# Patient Record
Sex: FEMALE | Race: WHITE | Employment: FULL TIME | URBAN - METROPOLITAN AREA
[De-identification: names, ages, dates, MRNs, and addresses within clinical notes are randomized per-mention and may not be internally consistent; named-entity substitution may affect disease eponyms.]

---

## 2021-07-08 ENCOUNTER — OFFICE VISIT (OUTPATIENT)
Dept: PULMONOLOGY | Age: 51
End: 2021-07-08
Payer: COMMERCIAL

## 2021-07-08 VITALS
DIASTOLIC BLOOD PRESSURE: 89 MMHG | SYSTOLIC BLOOD PRESSURE: 148 MMHG | OXYGEN SATURATION: 98 % | TEMPERATURE: 97.8 F | HEART RATE: 87 BPM | HEIGHT: 65 IN | WEIGHT: 222.8 LBS | BODY MASS INDEX: 37.12 KG/M2

## 2021-07-08 DIAGNOSIS — J32.9 SINUSITIS, UNSPECIFIED CHRONICITY, UNSPECIFIED LOCATION: ICD-10-CM

## 2021-07-08 DIAGNOSIS — G47.33 OBSTRUCTIVE SLEEP APNEA: ICD-10-CM

## 2021-07-08 DIAGNOSIS — G47.10 HYPERSOMNOLENCE: ICD-10-CM

## 2021-07-08 DIAGNOSIS — J45.40 MODERATE PERSISTENT ASTHMA WITHOUT COMPLICATION: Primary | ICD-10-CM

## 2021-07-08 DIAGNOSIS — E66.9 CLASS 2 OBESITY WITHOUT SERIOUS COMORBIDITY WITH BODY MASS INDEX (BMI) OF 37.0 TO 37.9 IN ADULT, UNSPECIFIED OBESITY TYPE: ICD-10-CM

## 2021-07-08 PROCEDURE — 99214 OFFICE O/P EST MOD 30 MIN: CPT | Performed by: INTERNAL MEDICINE

## 2021-07-08 ASSESSMENT — ENCOUNTER SYMPTOMS
GASTROINTESTINAL NEGATIVE: 1
EYES NEGATIVE: 1
RESPIRATORY NEGATIVE: 1
ALLERGIC/IMMUNOLOGIC NEGATIVE: 1

## 2021-07-08 NOTE — LETTER
7/8/21        Johnathon Pichardo      I have seen this patient in the office today and wanted to communicate my findings and recommendations. Patient Instructions      ASSESSMENT/PLAN:  1. Moderate persistent asthma without complication  2. Obstructive sleep apnea  3. Hypersomnolence  4. Class 2 obesity without serious comorbidity with body mass index (BMI) of 37.0 to 37.9 in adult, unspecified obesity type  5. Sinusitis, unspecified chronicity, unspecified location        This is the old plan, may go back to this later  Winter plan: Once and if the cold weather comes and you start having trouble breathing  Then go to dulera 200/5 2puff twice a day  Or if the use of albuterol rescue > 2 times a week  Or at night waking up with shortness of breath or wheezing        Summer Plan:   start on Dulera 100/5  And will consider ordering this at that time     Fall/Winter plan  On Dulera 200    Right now on   dulera 200 2 puffs twice a day, plan on staying on this for 1 month    2/19/15  Spirometry FEV1 was 95% and 2.89 liter    1/24/19  spirometry showed FEV1 of 94% and 2.79  FENO was 5 ppb    Continue with :  claritin once a day  nasaonex 2 spray/nostril once a day  Albuterol (rescue) Ventolin 2 puffs as needed. Dulera 200/5 2puff twice a day         Tried   dulera 100 but needed 200 with change of weather     Continue with exercise  Sinus are control    Only using albuterol prior to exercise  No limitations at this time    flushot up to date.     Blood pressure was 148/89 watch this, maybe needed repeat  Maybe the white coat syndrome        pneumovac utd    flushot up to date    autopap is set min 5  And max of 15  epr is 3  Standard response  dme is [facility]    Using nasal pillows  I have access via [employer]    Using 83%  Using 6.5 h/night    ahi is 2.0 no sleep apnea  Leak at 6.6 lpm  90% pressure is 9.0    Feeling the benefit of cpap use    Will need to get results of the sleep study      RTC in 3 months.

## 2021-07-08 NOTE — PATIENT INSTRUCTIONS
ASSESSMENT/PLAN:  1. Moderate persistent asthma without complication  2. Obstructive sleep apnea  3. Hypersomnolence  4. Class 2 obesity without serious comorbidity with body mass index (BMI) of 37.0 to 37.9 in adult, unspecified obesity type  5. Sinusitis, unspecified chronicity, unspecified location        This is the old plan, may go back to this later  Winter plan: Once and if the cold weather comes and you start having trouble breathing  Then go to dulera 200/5 2puff twice a day  Or if the use of albuterol rescue > 2 times a week  Or at night waking up with shortness of breath or wheezing        Summer Plan:   start on Dulera 100/5  And will consider ordering this at that time     Fall/Winter plan  On Dulera 200    Right now on   dulera 200 2 puffs twice a day, plan on staying on this for 1 month    2/19/15  Spirometry FEV1 was 95% and 2.89 liter    1/24/19  spirometry showed FEV1 of 94% and 2.79  FENO was 5 ppb    Continue with :  claritin once a day  nasaonex 2 spray/nostril once a day  Albuterol (rescue) Ventolin 2 puffs as needed. Dulera 200/5 2puff twice a day         Tried   dulera 100 but needed 200 with change of weather     Continue with exercise  Sinus are control    Only using albuterol prior to exercise  No limitations at this time    flushot up to date. Blood pressure was 148/89 watch this, maybe needed repeat  Maybe the white coat syndrome        pneumovac utd    flushot up to date    autopap is set min 5  And max of 15  epr is 3  Standard response  dme is [facility]    Using nasal pillows  I have access via airview    Using 83%  Using 6.5 h/night    ahi is 2.0 no sleep apnea  Leak at 6.6 lpm  90% pressure is 9.0    Feeling the benefit of cpap use    Will need to get results of the sleep study      RTC in 3 months.        CLEANING INSTRUCTIONS     Keeping your equipment and supplies clean is very important.      REMINDER: Only use DISTILLED WATER in your humidifier, Empty water daily.    DAILY  Mask and tubing:   · Wash your face before applying mask  · Wash mask and tubing with baby shampoo and warm water. Humidifier:   · Empty water in reservoir  · Clean with baby shampoo and warm water  · Rinse, then air dry    WEEKLY  Mask and tubing:   · Soak your mask and tubing in 1 part vinegar and 3 parts water for 30 minutes. Rinse, and allow to air dry. Humidifier:   · Wash with warm water and baby shampoo  · Soak in 1 part vinegar and 3 parts water for 30 minutes  · Rinse with warm water and allow to air dry    Machine Exterior:   · Wipe with a clean damp cloth    MONTHLY AND/OR AS NEEDED  · Reusable foam filters (black filter)- wash in warm water with baby shampoo. Rinse well and dry with paper towel  · Disposable felt filter (white filter)- Replace filter every two weeks to once a month    NOTE: If you are having repeated sinus and /or respiratory infections, dirty equipment may be the cause. It may help to clean and disinfect your equipment more frequently    TRAVELING  Always make sure the humidifier is empty when traveling. (including doctor appointments, air travel or long distance driving). When flying, always carry your PAP device with you as a carry on item, NEVER check it in as baggage on airlines. You will have to remove the CPAP unit so that it can be tested by the TSA. We can provide you with a letter stating it is a medical device, talk to your provider. Always make sure you have your mask and tubing with you. You will need appropriate plug adapters when traveling outside the [place]. If flying and unable to carry distilled water with you do not use tap water but instead use bottled water (no more than 2 weeks). PAP SUPPLY REPLACEMENT SCHEDULE    To get the most benefit from your PAP therapy, your equipment should be replaced when necessary based on wear and tear.  For example, your mask may need to be replaced if you notice it is cracked or the seal is leaking. If your tubing is torn, it needs to be replaced. If you equipment is showing signs of wear, you may be entitled to replace it. The replacement schedule for Medicare patients is shown below. If you are NOT a Medicare patient, please check with your DME (Durable Medical Supply) provider for your individual insurance policy's replacement schedule.      Equipment Medicare My Insurance Plan  Mask 1 per 3 months _______________  Nasal replacement cushion 1 per month _______________  Pillows replacement cushion 1 per month _______________  Full-face cushion 1 per month _______________  Headgear 1 per 6 months _______________  Chinstrap 1 per 6 months _______________  Water chamber 1 per 6 months _______________  Tubing 1 per 3 months _______________  Filter, fine disposable (white) 2 per month _______________  Filter, gross particle foam (black) 1 per 6 months _______________  Therapy device 1 per 5 years _______________

## 2021-07-08 NOTE — PROGRESS NOTES
Víctor Guerin (:  1970) is a 54 y.o. female,Established patient, here for evaluation of the following chief complaint(s):  Asthma (asthma f/u / )         ASSESSMENT/PLAN:  1. Moderate persistent asthma without complication  2. Obstructive sleep apnea  3. Hypersomnolence  4. Class 2 obesity without serious comorbidity with body mass index (BMI) of 37.0 to 37.9 in adult, unspecified obesity type  5. Sinusitis, unspecified chronicity, unspecified location        This is the old plan, may go back to this later  Winter plan: Once and if the cold weather comes and you start having trouble breathing  Then go to dulera 200/5 2puff twice a day  Or if the use of albuterol rescue > 2 times a week  Or at night waking up with shortness of breath or wheezing        Summer Plan:   start on Dulera 100/5  And will consider ordering this at that time     Fall/Winter plan  On Dulera 200    Right now on   dulera 200 2 puffs twice a day, plan on staying on this for 1 month    2/19/15  Spirometry FEV1 was 95% and 2.89 liter    19  spirometry showed FEV1 of 94% and 2.79  FENO was 5 ppb    Continue with :  claritin once a day  nasaonex 2 spray/nostril once a day  Albuterol (rescue) Ventolin 2 puffs as needed. Dulera 200/5 2puff twice a day         Tried   dulera 100 but needed 200 with change of weather     Continue with exercise  Sinus are control    Only using albuterol prior to exercise  No limitations at this time    flushot up to date.     Blood pressure was 148/89 watch this, maybe needed repeat  Maybe the white coat syndrome        pneumovac utd    flushot up to date    autopap is set min 5  And max of 15  epr is 3  Standard response  dme is [facility]    Using nasal pillows  I have access via airview    Using 83%  Using 6.5 h/night    ahi is 2.0 no sleep apnea  Leak at 6.6 lpm  90% pressure is 9.0    Feeling the benefit of cpap use    Will need to get results of the sleep study      RTC in 3 months.  No follow-ups on file. I have personally reviewed and summarized the old records a      I have independently reviewed the images and reviewed with patient    I have reviewed the lab tests, radiology reports and medications    I have downloaded and interpreted the cpap/bipap/pap data. I have made adjustments as described    Reviewed present meds and side effects. Continue present meds. Stay compliant. Call if worsens. Reviewed proper inhaler usage    Will ask for old records              Janell Gutierrez [address]  [address]  [address] [facility] [place] [facility] [address]  Dept: [phone]  Loc: [phone]     Diagnosis:  [x] HENRY (ICD-10: G47.33)  o CSA (ICD-10: G47.31)  [] Complex Sleep Apnea (ICD-10: G47.37)  []  (ICD-10: G47.37)  [] Hypoxemia (ICD-10: R09.02)  [] COPD (J44.90)  [] Chronic Respiratory Failure with hypoxemia (J96.11)  [] Chronicr Respiratory Failure with hypercapnia (J96.12)  [] Restrictive Lung Disease (J98.4)      [] New Rx (Device Preference: _________________________)     [] Change Order       [] Replace ___________    [] Discontinue Order -  [] CPAP    [] BPAP    [] PAP    [] Oxygen   /   AMA?  [] Yes   [] No              Therapy    AHI: ________ CAMMY: ________  LOW SpO2: ________%      DME:[facility]    DEVICE / SETTINGS RAMP / COMFORT INTERFACE   []  CPAP () Pressure    Ramp: _________ min's  [] Ramp to patient preference General PAP Supply Kit  Medicaid does not cover heated tubing  ([employer])  PAP Tubing:  [x] Heated ()- 1/3 mos                         [x] Regular ()  1/3 mos  [x] Disposable Water Chamber () - 1/6 mos  [x] Disposable Filter () - 2/mo  [x] Non-Disposable Filter () - 1/6 mos   []  BiLevel PAP ()           IPAP        []  BiLevel PAP with   ()       Backup Rate ()      EPAP Rate  [] Adjust FLEX to patient comfort       SUPPLEMENTAL OXYGEN  [] OXYGEN:      Liter/min: Rate                                         Inspiratory time                                                         The following equipment is Medically Necessary for the above stated patient. It is reasonable and necessary in reference to acceptable standards of medical practice for this condition, and is not prescribed as a convenience. Frequency of Use:    Daily                 Length of Need: 13 Months              o The patient requires BiLevel PAP and the following apply: []  The patient requires a Respiratory Assist Device (RAD) and the following apply:   o CPAP was tried and failed to meet therapeutic goals. [] CPAP was tried, but failed to meet therapeutic goals   o The prescribed mask interface has been properly fit, is the most comfortable to the patient and will be used with the BPAP device. [] The prescribed mask interface has been properly fit, is the most comfortable to the patient and will be used with the RAD.   o Current CPAP setting prevents patient from tolerating the therapy and lower CPAP settings fail to adequately control the symptoms of HENRY, improve sleep quality, or reduce AHI to acceptable levels. [] Current CPAP setting prevent patient from tolerating the therapy and lower PAP settings fail to  adequately control HENRY symptoms, improve sleep quality, or reduce AHI to acceptable levels.          [] There is significant improvement of the respiratory events on the RAD                                                                                                                                                                                                                                  Gigi Alves MD NPI- [id number]     [place]- [id number]                    07/08/21       ____________________________                        _______________________           Physician Signature                                                         Date cough  No wheezing after steroids    Overall seems to be getting better    Now back on dulera 200 mg  Albuterol as needed      Since cpap  No snoring  No bloating  No burping  No headache  No chest pain  No ear popping  No rls  No plms    Going to bed at 11 pm and waking up 8 am  Sleepiness is little better          Review of Systems   Constitutional: Negative. HENT: Negative. Eyes: Negative. Respiratory: Negative. Cardiovascular: Negative. Gastrointestinal: Negative. Endocrine: Negative. Genitourinary: Negative. Musculoskeletal: Negative. Skin: Negative. Allergic/Immunologic: Negative. Neurological: Negative. Hematological: Negative. Psychiatric/Behavioral: Negative. Objective   Physical Exam  Vitals and nursing note reviewed. Constitutional:       General: She is not in acute distress. Appearance: Normal appearance. She is not ill-appearing. HENT:      Head: Normocephalic and atraumatic. Right Ear: External ear normal.      Left Ear: External ear normal.      Nose: Nose normal.      Mouth/Throat:      Mouth: Mucous membranes are moist.      Pharynx: Oropharynx is clear. Comments: Mallampati 2  Eyes:      General: No scleral icterus. Extraocular Movements: Extraocular movements intact. Conjunctiva/sclera: Conjunctivae normal.      Pupils: Pupils are equal, round, and reactive to light. Cardiovascular:      Rate and Rhythm: Normal rate and regular rhythm. Pulses: Normal pulses. Heart sounds: Normal heart sounds. No murmur heard. No friction rub. Pulmonary:      Effort: Pulmonary effort is normal. No respiratory distress. Breath sounds: Normal breath sounds. No stridor. No wheezing, rhonchi or rales. Chest:      Chest wall: No tenderness. Abdominal:      General: Abdomen is flat. Bowel sounds are normal. There is no distension. Tenderness: There is no abdominal tenderness. There is no guarding.    Musculoskeletal: General: No swelling or tenderness. Normal range of motion. Cervical back: Normal range of motion and neck supple. No rigidity. Skin:     General: Skin is warm and dry. Coloration: Skin is not jaundiced. Neurological:      General: No focal deficit present. Mental Status: She is alert and oriented to person, place, and time. Mental status is at baseline. Cranial Nerves: No cranial nerve deficit. Sensory: No sensory deficit. Motor: No weakness. Gait: Gait normal.   Psychiatric:         Mood and Affect: Mood normal.         Thought Content:  Thought content normal.         Judgment: Judgment normal.                  An electronic signature was used to authenticate this note.    --Gigi Alves MD

## 2021-07-12 RX ORDER — MOMETASONE FUROATE 50 UG/1
2 SPRAY, METERED NASAL DAILY
Qty: 1 INHALER | Refills: 2 | Status: SHIPPED | OUTPATIENT
Start: 2021-07-12

## 2021-07-29 RX ORDER — MOMETASONE FUROATE 50 UG/1
2 SPRAY, METERED NASAL DAILY
Qty: 3 INHALER | Refills: 1 | Status: SHIPPED | OUTPATIENT
Start: 2021-07-29 | End: 2022-03-11

## 2021-07-29 NOTE — TELEPHONE ENCOUNTER
Patient requesting 90 day scripts for nasonex and both strengths of dulera. Scripts pended if appropriate.

## 2021-12-27 ENCOUNTER — OFFICE VISIT (OUTPATIENT)
Dept: PULMONOLOGY | Age: 51
End: 2021-12-27
Payer: COMMERCIAL

## 2021-12-27 VITALS
TEMPERATURE: 98.2 F | HEART RATE: 98 BPM | RESPIRATION RATE: 18 BRPM | WEIGHT: 204 LBS | HEIGHT: 65 IN | BODY MASS INDEX: 33.99 KG/M2 | DIASTOLIC BLOOD PRESSURE: 93 MMHG | SYSTOLIC BLOOD PRESSURE: 155 MMHG | OXYGEN SATURATION: 98 %

## 2021-12-27 DIAGNOSIS — G47.10 HYPERSOMNOLENCE: ICD-10-CM

## 2021-12-27 DIAGNOSIS — G47.33 OBSTRUCTIVE SLEEP APNEA: ICD-10-CM

## 2021-12-27 DIAGNOSIS — J45.40 MODERATE PERSISTENT ASTHMA WITHOUT COMPLICATION: Primary | ICD-10-CM

## 2021-12-27 DIAGNOSIS — E66.9 CLASS 2 OBESITY WITHOUT SERIOUS COMORBIDITY WITH BODY MASS INDEX (BMI) OF 37.0 TO 37.9 IN ADULT, UNSPECIFIED OBESITY TYPE: ICD-10-CM

## 2021-12-27 DIAGNOSIS — J32.9 SINUSITIS, UNSPECIFIED CHRONICITY, UNSPECIFIED LOCATION: ICD-10-CM

## 2021-12-27 PROCEDURE — 99214 OFFICE O/P EST MOD 30 MIN: CPT | Performed by: INTERNAL MEDICINE

## 2021-12-27 RX ORDER — ALBUTEROL SULFATE 90 UG/1
2 AEROSOL, METERED RESPIRATORY (INHALATION) EVERY 6 HOURS PRN
COMMUNITY

## 2021-12-27 ASSESSMENT — ENCOUNTER SYMPTOMS
EYES NEGATIVE: 1
ALLERGIC/IMMUNOLOGIC NEGATIVE: 1
RESPIRATORY NEGATIVE: 1
GASTROINTESTINAL NEGATIVE: 1

## 2021-12-27 NOTE — PROGRESS NOTES
Rubia Alston (:  1970) is a 54 y.o. female,Established patient, here for evaluation of the following chief complaint(s):  Follow-up (3 mon Sleep/lungs)         ASSESSMENT/PLAN:  1. Moderate persistent asthma without complication  2. Obstructive sleep apnea  3. Hypersomnolence  4. Class 2 obesity without serious comorbidity with body mass index (BMI) of 37.0 to 37.9 in adult, unspecified obesity type  5. Sinusitis, unspecified chronicity, unspecified location        This is the old plan, may go back to this later  Winter plan: Once and if the cold weather comes and you start having trouble breathing  Then go to dulera 200/5 2puff twice a day  Or if the use of albuterol rescue > 2 times a week  Or at night waking up with shortness of breath or wheezing        Summer Plan:   start on Dulera 100/5  And will consider ordering this at that time     Fall/Winter plan  On Dulera 200    Right now on   dulera 100 2 puffs twice a day    2/19/15  Spirometry FEV1 was 95% and 2.89 liter    19  spirometry showed FEV1 of 94% and 2.79  FENO was 5 ppb    Continue with :  claritin once a day  nasaonex 2 spray/nostril once a day  Albuterol (rescue) Ventolin 2 puffs as needed. Dulera 100/5 2puff twice a day         Tried   dulera 100 but needed 200 with change of weather     Continue with exercise  Sinus are control    Only using albuterol prior to exercise  No limitations at this time    flushot up to date.     Blood pressure was high watch this, maybe needed repeat  This is white coat syndrome        pneumovac utd  covid up todate with booster  flushot up to date    autopap is set min 5  And max of 15  epr is 3  Standard response  dme is [facility]    Using nasal pillows  I have access via airview    Using 100%  Using 7.5 h/night    ahi is 1.2 no sleep apnea  Leak at 1.3 lpm  90% pressure is 7.3    Feeling the benefit of cpap use    Will need to get results of the sleep study      RTC in 6 months.          No _________  [] Continuous        [] Nocturnal  [] Bleed into PAP Device      []  [facility] Interface Kit    [x] Mask interface () - 1/3 mos  [x] Nasal Cushion ()  2/mo  [x] Nasal Pillows ()  -2/mo  [x] Headgear ()  1/6 mos   []  AutoBiLevel () Pressure  ()      Support           []  ResMed® IVAPS EPAP  [] Overnight Oximetry on Room Air  [] Overnight Oximetry on PAP Therapy    Target Pt Rate  Min PS      Target Va  Patient Ht  Ti Max                Ti Min        Rise time  Max PS  Trigger  Cycle  Epap  Epap max  Epap min  The patient has a history of:  [] Excessive Daytime Sleepiness  [] Insomnia  [] Impaired Cognition  [] Ischemic Heart Disease  [] Hypertension  [] Mood Disorders          [] History of Stroke  Additional Orders:______________________________________________________________________________________________________________________________________________________________________________     Full Face Mask Kit    [] Full face mask ()  1/3 mos  [] Full Face Cushion ()  1/mo  [] Headgear ()  1/6 mos                                           [] Respironics® ASV Advanced ()     EPAP Min  PS Min      EPAP Max  PS Max   Additional Supplies    [] Chin Strap ()  [] Heated Humidifier Kit ()  Mask Specifications:   [] Patient Preference      -or-            Brand:______________ Size:_______________   Type: __________________________________   [] Mask Refit:___________________________                                           Max Press  Ramp Time      Rate  Bi-flex: []1  []2  []3     [] Respironics® AVAPS: ()     IPAP Min  IPAP Max  Pressure Max  Epap max  Epap min  Rise time                      Avaps rate  EPAP   Additional Services    [] Annual PRN service and check of equipment  [] Routine service and check of equipment  [] Download and report compliance data   Tidal volume      Tigger Rate                                         Inspiratory time                                                         The following equipment is Medically Necessary for the above stated patient. It is reasonable and necessary in reference to acceptable standards of medical practice for this condition, and is not prescribed as a convenience. Frequency of Use:    Daily                 Length of Need: 13 Months              o The patient requires BiLevel PAP and the following apply: []  The patient requires a Respiratory Assist Device (RAD) and the following apply:   o CPAP was tried and failed to meet therapeutic goals. [] CPAP was tried, but failed to meet therapeutic goals   o The prescribed mask interface has been properly fit, is the most comfortable to the patient and will be used with the BPAP device. [] The prescribed mask interface has been properly fit, is the most comfortable to the patient and will be used with the RAD.   o Current CPAP setting prevents patient from tolerating the therapy and lower CPAP settings fail to adequately control the symptoms of HENRY, improve sleep quality, or reduce AHI to acceptable levels. [] Current CPAP setting prevent patient from tolerating the therapy and lower PAP settings fail to  adequately control HENRY symptoms, improve sleep quality, or reduce AHI to acceptable levels.          [] There is significant improvement of the respiratory events on the RAD                                                                                                                                                                                                                                  Xena Bellamy MD NPI- [id number]     KOTKA- [id number]                    12/27/21       ____________________________                        _______________________           Physician Signature                                                         Date Subjective   SUBJECTIVE/OBJECTIVE:  +      And staying with this dose    Was not able to go back to 100 dulera  And still on the 200    Albuterol prn    Using albuterol before exercise    No exacerbations    albuteorl prn    No GERD  Sinus stable with medications    Albuterol prn only with exercise    No noct symptoms    Still on dulera 200    Walked up 5 flights           Jose Juan Rodriguez is a 54year old year old, female, with PMH significant for asthma, that presents today for c/o poor sleep quality. Pt reports nightly snoring for 3 years that it is getting worse. Pt reports does snore moderate for 3 year(s). Patient's partner does complain of pt snoring. Pt's partner does notice that she stops breathing during sleep. Pt's does wake herself with snoring. Pt does report fatigue or tiredness frequently. Pt sleeps more than 7 hours, and overwhelming sleepiness attacks. Pt dozes unintentionally while watching TV. While driving does feel drowsy. Pt does not have h/o sleepiness associated wrecks/near wrecks. Pt does not report having restless legs. She does report having nasal congestion. Positive for use of nasal sprays, nose or sinus surgery. Positive for broken nose, negative tonsillectomy, sleeping w/ chest raised. She does not sleep with oxygen. Pt does not have a dental appliance or braces on teeth. Denies teeth grinding. She does not report nightmares, sleep walking, or dreaming during naps. When angry or laughing Jose Juan Rodriguez does not report cataplexy. She does not report hallucinations when dozing off or immediately upon awakening. She does not report sleep paralysis. Denies parasomnia. She denies sleep walking, sleep talking, enuresis and sleep terrors as a child. Patient's [place] Sleepiness score : 3 is inconsistent with moderate daytime sleepiness.           Bronchitis in June 2021  And given steroids  Dec bs at bases  Some sob  Some cough  No wheezing after steroids    Overall seems to be getting better    Now back on dulera 200 mg  Albuterol as needed      Since cpap  No snoring  No bloating  No burping  No headache  No chest pain  No ear popping  No rls  No plms    Going to bed at 11 pm and waking up 8 am  Sleepiness is little better          Review of Systems   Constitutional: Negative. HENT: Negative. Eyes: Negative. Respiratory: Negative. Cardiovascular: Negative. Gastrointestinal: Negative. Endocrine: Negative. Genitourinary: Negative. Musculoskeletal: Negative. Skin: Negative. Allergic/Immunologic: Negative. Neurological: Negative. Hematological: Negative. Psychiatric/Behavioral: Negative. Vitals:    12/27/21 1122   BP: (!) 155/93   Pulse: 98   Resp: 18   Temp: 98.2 °F (36.8 °C)   TempSrc: Temporal   SpO2: 98%   Weight: 204 lb (92.5 kg)   Height: 5' 5\" (1.651 m)       Objective   Physical Exam  Vitals and nursing note reviewed. Constitutional:       General: She is not in acute distress. Appearance: Normal appearance. She is not ill-appearing. HENT:      Head: Normocephalic and atraumatic. Right Ear: External ear normal.      Left Ear: External ear normal.      Nose: Nose normal.      Mouth/Throat:      Mouth: Mucous membranes are moist.      Pharynx: Oropharynx is clear. Comments: Mallampati 2  Eyes:      General: No scleral icterus. Extraocular Movements: Extraocular movements intact. Conjunctiva/sclera: Conjunctivae normal.      Pupils: Pupils are equal, round, and reactive to light. Cardiovascular:      Rate and Rhythm: Normal rate and regular rhythm. Pulses: Normal pulses. Heart sounds: Normal heart sounds. No murmur heard. No friction rub. Pulmonary:      Effort: Pulmonary effort is normal. No respiratory distress. Breath sounds: Normal breath sounds. No stridor. No wheezing, rhonchi or rales. Chest:      Chest wall: No tenderness. Abdominal:      General: Abdomen is flat. Bowel sounds are normal. There is no distension. Tenderness: There is no abdominal tenderness. There is no guarding. Musculoskeletal:         General: No swelling or tenderness. Normal range of motion. Cervical back: Normal range of motion and neck supple. No rigidity. Skin:     General: Skin is warm and dry. Coloration: Skin is not jaundiced. Neurological:      General: No focal deficit present. Mental Status: She is alert and oriented to person, place, and time. Mental status is at baseline. Cranial Nerves: No cranial nerve deficit. Sensory: No sensory deficit. Motor: No weakness. Gait: Gait normal.   Psychiatric:         Mood and Affect: Mood normal.         Thought Content:  Thought content normal.         Judgment: Judgment normal.                  An electronic signature was used to authenticate this note.    --Gil Lovell MD

## 2021-12-27 NOTE — PATIENT INSTRUCTIONS
ASSESSMENT/PLAN:  1. Moderate persistent asthma without complication  2. Obstructive sleep apnea  3. Hypersomnolence  4. Class 2 obesity without serious comorbidity with body mass index (BMI) of 37.0 to 37.9 in adult, unspecified obesity type  5. Sinusitis, unspecified chronicity, unspecified location        This is the old plan, may go back to this later  Winter plan: Once and if the cold weather comes and you start having trouble breathing  Then go to dulera 200/5 2puff twice a day  Or if the use of albuterol rescue > 2 times a week  Or at night waking up with shortness of breath or wheezing        Summer Plan:   start on Dulera 100/5  And will consider ordering this at that time     Fall/Winter plan  On Dulera 200    Right now on   dulera 100 2 puffs twice a day    2/19/15  Spirometry FEV1 was 95% and 2.89 liter    1/24/19  spirometry showed FEV1 of 94% and 2.79  FENO was 5 ppb    Continue with :  claritin once a day  nasaonex 2 spray/nostril once a day  Albuterol (rescue) Ventolin 2 puffs as needed. Dulera 100/5 2puff twice a day         Tried   dulera 100 but needed 200 with change of weather     Continue with exercise  Sinus are control    Only using albuterol prior to exercise  No limitations at this time    flushot up to date. Blood pressure was high watch this, maybe needed repeat  This is white coat syndrome        pneumovac utd  covid up todate with booster  flushot up to date    autopap is set min 5  And max of 15  epr is 3  Standard response  dme is [facility]    Using nasal pillows  I have access via [employer]    Using 100%  Using 7.5 h/night    ahi is 1.2 no sleep apnea  Leak at 1.3 lpm  90% pressure is 7.3    Feeling the benefit of cpap use    Will need to get results of the sleep study      RTC in 6 months.    Remember to bring a list of pulmonary medications and any CPAP or BiPAP machines to your next appointment with the office.      Please keep all of your future appointments scheduled by [facility], [facility] office. Out of respect for other patients and providers, you may be asked to reschedule your appointment if you arrive later than your scheduled appointment time. Appointments cancelled less than 24hrs in advance will be considered a no show. Patients with three missed appointments within 1 year or four missed appointments within 2 years can be dismissed from the practice. Please be aware that our physicians are required to work in the Intensive Care Unit at [facility].  Your appointment may need to be rescheduled if they are designated to work during your appointment time. You may receive a survey regarding the care you received during your visit. Your input is valuable to us. We encourage you to complete and return your survey. We hope you will choose us in the future for your healthcare needs. Pt instructed of all future appointment dates & times, including radiology, labs, procedures & referrals. If procedures were scheduled preparation instructions provided. Instructions on future appointments with [facility] were given.

## 2022-03-12 RX ORDER — MOMETASONE FUROATE 50 UG/1
SPRAY, METERED NASAL
Qty: 3 EACH | Refills: 3 | Status: SHIPPED | OUTPATIENT
Start: 2022-03-12 | End: 2022-05-25 | Stop reason: CLARIF

## 2022-05-25 ENCOUNTER — OFFICE VISIT (OUTPATIENT)
Dept: PULMONOLOGY | Age: 52
End: 2022-05-25
Payer: COMMERCIAL

## 2022-05-25 VITALS
DIASTOLIC BLOOD PRESSURE: 99 MMHG | TEMPERATURE: 98 F | HEIGHT: 65 IN | BODY MASS INDEX: 34.82 KG/M2 | WEIGHT: 209 LBS | SYSTOLIC BLOOD PRESSURE: 154 MMHG | RESPIRATION RATE: 16 BRPM | HEART RATE: 102 BPM | OXYGEN SATURATION: 99 %

## 2022-05-25 DIAGNOSIS — G47.10 HYPERSOMNOLENCE: ICD-10-CM

## 2022-05-25 DIAGNOSIS — E66.9 CLASS 2 OBESITY WITHOUT SERIOUS COMORBIDITY WITH BODY MASS INDEX (BMI) OF 37.0 TO 37.9 IN ADULT, UNSPECIFIED OBESITY TYPE: ICD-10-CM

## 2022-05-25 DIAGNOSIS — J45.40 MODERATE PERSISTENT ASTHMA WITHOUT COMPLICATION: Primary | ICD-10-CM

## 2022-05-25 DIAGNOSIS — J32.9 SINUSITIS, UNSPECIFIED CHRONICITY, UNSPECIFIED LOCATION: ICD-10-CM

## 2022-05-25 DIAGNOSIS — G47.33 OBSTRUCTIVE SLEEP APNEA: ICD-10-CM

## 2022-05-25 PROCEDURE — 99214 OFFICE O/P EST MOD 30 MIN: CPT | Performed by: INTERNAL MEDICINE

## 2022-05-25 ASSESSMENT — ENCOUNTER SYMPTOMS
EYES NEGATIVE: 1
RESPIRATORY NEGATIVE: 1
GASTROINTESTINAL NEGATIVE: 1
ALLERGIC/IMMUNOLOGIC NEGATIVE: 1

## 2022-05-25 NOTE — PROGRESS NOTES
MA Communication:   The following orders are received by verbal communication from Sharon Gates MD    Orders include:  8 mo fu scheduled 1/11/23

## 2022-05-25 NOTE — LETTER
[facility] Critical [facility] Sleep  [address] [address] [address]  Phone: [phone]  Fax: [phone]    Michel Runner, MD        May 25, 2022     Patient: Haily Daily   YOB: 1970   Date of Visit: 5/25/2022 5/25/22        Haily Conception      I have seen this patient in the office today and wanted to communicate my findings and recommendations. Patient Instructions        ASSESSMENT/PLAN:  1. Moderate persistent asthma without complication  2. Obstructive sleep apnea  3. Hypersomnolence  4. Class 2 obesity without serious comorbidity with body mass index (BMI) of 37.0 to 37.9 in adult, unspecified obesity type  5. Sinusitis, unspecified chronicity, unspecified location        This is the old plan, may go back to this later  Winter plan: Once and if the cold weather comes and you start having trouble breathing  Then go to dulera 200/5 2puff twice a day  Or if the use of albuterol rescue > 2 times a week  Or at night waking up with shortness of breath or wheezing     May consider using  dulera 200 if any congestion twice a day for 5-7 days     Summer Plan:   start on Dulera 100/5  And will consider ordering this at that time     Fall/Winter plan  On Dulera 200    Right now on   dulera 100 2 puffs twice a day    2/19/15  Spirometry FEV1 was 95% and 2.89 liter    1/24/19  spirometry showed FEV1 of 94% and 2.79  FENO was 5 ppb    Continue with :  claritin once a day  nasaonex 2 spray/nostril once a day  Albuterol (rescue) Ventolin 2 puffs as needed, only with exercise  Dulera 100/5 2puff twice a day         Continue with exercise  Sinus are control    Only using albuterol prior to exercise  No limitations at this time    flushot up to date.     Blood pressure was high watch this, maybe needed repeat  This is white coat syndrome        pneumovac utd  covid up todate with booster  flushot up to date            autopap is set min 5  And max of 15  epr is 3  Standard response  dme is [facility]    Using nasal pillows  I have access via airview    Using 100%  Using 7.5 h/night      ahi is 1.9 no sleep apnea  Leak at 0.5 lpm  90% pressure is 8.5  New mask and doing well, with nasal mask    Feeling the benefit of cpap use    Will need to get results of the sleep study      RTC in 8 months.                           Thank you for allowing me to assist in the care of the MD Sharon Leong MD

## 2022-05-25 NOTE — PATIENT INSTRUCTIONS
ASSESSMENT/PLAN:  1. Moderate persistent asthma without complication  2. Obstructive sleep apnea  3. Hypersomnolence  4. Class 2 obesity without serious comorbidity with body mass index (BMI) of 37.0 to 37.9 in adult, unspecified obesity type  5. Sinusitis, unspecified chronicity, unspecified location        This is the old plan, may go back to this later  Winter plan: Once and if the cold weather comes and you start having trouble breathing  Then go to dulera 200/5 2puff twice a day  Or if the use of albuterol rescue > 2 times a week  Or at night waking up with shortness of breath or wheezing     May consider using  dulera 200 if any congestion twice a day for 5-7 days     Summer Plan:   start on Dulera 100/5  And will consider ordering this at that time     Fall/Winter plan  On Dulera 200    Right now on   dulera 100 2 puffs twice a day    2/19/15  Spirometry FEV1 was 95% and 2.89 liter    1/24/19  spirometry showed FEV1 of 94% and 2.79  FENO was 5 ppb    Continue with :  claritin once a day  nasaonex 2 spray/nostril once a day  Albuterol (rescue) Ventolin 2 puffs as needed, only with exercise  Dulera 100/5 2puff twice a day         Continue with exercise  Sinus are control    Only using albuterol prior to exercise  No limitations at this time    flushot up to date.     Blood pressure was high watch this, maybe needed repeat  This is white coat syndrome        pneumovac utd  covid up todate with booster  flushot up to date            autopap is set min 5  And max of 15  epr is 3  Standard response  dme is [facility]    Using nasal pillows  I have access via [employer]    Using 100%  Using 7.5 h/night      ahi is 1.9 no sleep apnea  Leak at 0.5 lpm  90% pressure is 8.5  New mask and doing well, with nasal mask    Feeling the benefit of cpap use    Will need to get results of the sleep study      RTC in 8 months.      Remember to bring a list of pulmonary medications and any CPAP or BiPAP machines to your next appointment with the office. Please keep all of your future appointments scheduled by [address], [facility] office. Out of respect for other patients and providers, you may be asked to reschedule your appointment if you arrive later than your scheduled appointment time. Appointments cancelled less than 24hrs in advance will be considered a no show. Patients with three missed appointments within 1 year or four missed appointments within 2 years can be dismissed from the practice. Please be aware that our physicians are required to work in the Intensive Care Unit at [facility].  Your appointment may need to be rescheduled if they are designated to work during your appointment time. You may receive a survey regarding the care you received during your visit. Your input is valuable to us. We encourage you to complete and return your survey. We hope you will choose us in the future for your healthcare needs. Pt instructed of all future appointment dates & times, including radiology, labs, procedures & referrals. If procedures were scheduled preparation instructions provided. Instructions on future appointments with [facility] were given.

## 2022-05-25 NOTE — PROGRESS NOTES
Toña Peterson (:  1970) is a 50 y.o. female,Established patient, here for evaluation of the following chief complaint(s):  Sleep Apnea and Asthma         ASSESSMENT/PLAN:  1. Moderate persistent asthma without complication  2. Obstructive sleep apnea  3. Hypersomnolence  4. Class 2 obesity without serious comorbidity with body mass index (BMI) of 37.0 to 37.9 in adult, unspecified obesity type  5. Sinusitis, unspecified chronicity, unspecified location        This is the old plan, may go back to this later  Winter plan: Once and if the cold weather comes and you start having trouble breathing  Then go to dulera 200/5 2puff twice a day  Or if the use of albuterol rescue > 2 times a week  Or at night waking up with shortness of breath or wheezing     May consider using  dulera 200 if any congestion twice a day for 5-7 days     Summer Plan:   start on Dulera 100/5  And will consider ordering this at that time     Fall/Winter plan  On Dulera 200    Right now on   dulera 100 2 puffs twice a day    2/19/15  Spirometry FEV1 was 95% and 2.89 liter    19  spirometry showed FEV1 of 94% and 2.79  FENO was 5 ppb    Continue with :  claritin once a day  nasaonex 2 spray/nostril once a day  Albuterol (rescue) Ventolin 2 puffs as needed, only with exercise  Dulera 100/5 2puff twice a day         Continue with exercise  Sinus are control    Only using albuterol prior to exercise  No limitations at this time    flushot up to date.     Blood pressure was high watch this, maybe needed repeat  This is white coat syndrome        pneumovac utd  covid up todate with booster  flushot up to date            autopap is set min 5  And max of 15  epr is 3  Standard response  dme is [facility]    Using nasal pillows  I have access via [employer]    Using 100%  Using 7.5 h/night      ahi is 1.9 no sleep apnea  Leak at 0.5 lpm  90% pressure is 8.5  New mask and doing well, with nasal mask    Feeling the benefit of cpap use    Will need to get results of the sleep study      RTC in 8 months.          No follow-ups on file. I have personally reviewed and summarized the old records a      I have independently reviewed the images and reviewed with patient    I have reviewed the lab tests, radiology reports and medications    I have downloaded and interpreted the cpap/bipap/pap data. I have made adjustments as described    Reviewed present meds and side effects. Continue present meds. Stay compliant. Call if worsens. Reviewed proper inhaler usage    Will ask for old records              [address] [facility]  [address]   Charis Godfrey [address] [place] [address]  Dept: [phone]  Loc: [phone]     Diagnosis:  [x] HENRY (ICD-10: G47.33)  o CSA (ICD-10: G47.31)  [] Complex Sleep Apnea (ICD-10: G47.37)  []  (ICD-10: G47.37)  [] Hypoxemia (ICD-10: R09.02)  [] COPD (J44.90)  [] Chronic Respiratory Failure with hypoxemia (J96.11)  [] Chronicr Respiratory Failure with hypercapnia (J96.12)  [] Restrictive Lung Disease (J98.4)      [] New Rx (Device Preference: _________________________)     [] Change Order       [] Replace ___________    [] Discontinue Order -  [] CPAP    [] BPAP    [] PAP    [] Oxygen   /   AMA?  [] Yes   [] No              Therapy    AHI: ________ CAMMY: ________  LOW SpO2: ________%      DME:[facility]    DEVICE / SETTINGS RAMP / COMFORT INTERFACE   []  CPAP () Pressure    Ramp: _________ min's  [] Ramp to patient preference General PAP Supply Kit  Medicaid does not cover heated tubing  ([employer])  PAP Tubing:  [x] Heated ()- 1/3 mos                         [x] Regular () - 1/3 mos  [x] Disposable Water Chamber () - 1/6 mos  [x] Disposable Filter () - 2/mo  [x] Non-Disposable Filter () - 1/6 mos   []  BiLevel PAP ()           IPAP        []  BiLevel PAP with   ()       Backup Rate ()      EPAP of equipment  [] Download and report compliance data   Tidal volume      Tigger                                     Rate                                         Inspiratory time                                                         The following equipment is Medically Necessary for the above stated patient. It is reasonable and necessary in reference to acceptable standards of medical practice for this condition, and is not prescribed as a convenience. Frequency of Use:    Daily                 Length of Need: 13 Months              o The patient requires BiLevel PAP and the following apply: []  The patient requires a Respiratory Assist Device (RAD) and the following apply:   o CPAP was tried and failed to meet therapeutic goals. [] CPAP was tried, but failed to meet therapeutic goals   o The prescribed mask interface has been properly fit, is the most comfortable to the patient and will be used with the BPAP device. [] The prescribed mask interface has been properly fit, is the most comfortable to the patient and will be used with the RAD.   o Current CPAP setting prevents patient from tolerating the therapy and lower CPAP settings fail to adequately control the symptoms of HENRY, improve sleep quality, or reduce AHI to acceptable levels. [] Current CPAP setting prevent patient from tolerating the therapy and lower PAP settings fail to  adequately control HENRY symptoms, improve sleep quality, or reduce AHI to acceptable levels.          [] There is significant improvement of the respiratory events on the RAD                                                                                                                                                                                                                                  Horacio Garcia MD               NPI- [id number]     [facility]- [phone]                    05/25/22       ____________________________                        _______________________ Physician Signature                                                         Date                                                   Subjective   SUBJECTIVE/OBJECTIVE:  +      And staying with this dose    Was not able to go back to 100 dulera  And still on the 200    Albuterol prn    Using albuterol before exercise    No exacerbations    albuteorl prn    No GERD  Sinus stable with medications    Albuterol prn only with exercise    No noct symptoms    Still on dulera 200    Walked up 5 flights           Frantz Boudreaux is a 54year old year old, female, with PMH significant for asthma, that presents today for c/o poor sleep quality. Pt reports nightly snoring for 3 years that it is getting worse. Pt reports does snore moderate for 3 year(s). Patient's partner does complain of pt snoring. Pt's partner does notice that she stops breathing during sleep. Pt's does wake herself with snoring. Pt does report fatigue or tiredness frequently. Pt sleeps more than 7 hours, and overwhelming sleepiness attacks. Pt dozes unintentionally while watching TV. While driving does feel drowsy. Pt does not have h/o sleepiness associated wrecks/near wrecks. Pt does not report having restless legs. She does report having nasal congestion. Positive for use of nasal sprays, nose or sinus surgery. Positive for broken nose, negative tonsillectomy, sleeping w/ chest raised. She does not sleep with oxygen. Pt does not have a dental appliance or braces on teeth. Denies teeth grinding. She does not report nightmares, sleep walking, or dreaming during naps. When angry or laughing Frantz Boudreaux does not report cataplexy. She does not report hallucinations when dozing off or immediately upon awakening. She does not report sleep paralysis. Denies parasomnia. She denies sleep walking, sleep talking, enuresis and sleep terrors as a child.        Patient's Linn Sleepiness score : 3 is inconsistent with moderate daytime sleepiness. Bronchitis in June 2021  And given steroids  Dec bs at bases  Some sob  Some cough  No wheezing after steroids    Overall seems to be getting better      Albuterol as needed      Since cpap  No snoring  No bloating  No burping  No headache  No chest pain  No ear popping  No rls  No plms    Going to bed at 11 pm and waking up 8 am  Sleepiness is little better      dulera 100 now for the past 2-3 months    Sinus issues at this time  Cough   And throat      Asthma  Her past medical history is significant for asthma. Review of Systems   Constitutional: Negative. HENT: Negative. Eyes: Negative. Respiratory: Negative. Cardiovascular: Negative. Gastrointestinal: Negative. Endocrine: Negative. Genitourinary: Negative. Musculoskeletal: Negative. Skin: Negative. Allergic/Immunologic: Negative. Neurological: Negative. Hematological: Negative. Psychiatric/Behavioral: Negative. Vitals:    05/25/22 1518 05/25/22 1522   BP: (!) 156/91 (!) 154/99   Site: Left Upper Arm Right Upper Arm   Position: Sitting Sitting   Cuff Size: Medium Adult Medium Adult   Pulse: (!) 102    Resp: 16    Temp: 98 °F (36.7 °C)    TempSrc: Temporal    SpO2: 99%    Weight: 209 lb (94.8 kg)    Height: 5' 5\" (1.651 m)        Objective   Physical Exam  Vitals and nursing note reviewed. Constitutional:       General: She is not in acute distress. Appearance: Normal appearance. She is not ill-appearing. HENT:      Head: Normocephalic and atraumatic. Right Ear: External ear normal.      Left Ear: External ear normal.      Nose: Nose normal.      Mouth/Throat:      Mouth: Mucous membranes are moist.      Pharynx: Oropharynx is clear. Comments: Mallampati 2  Eyes:      General: No scleral icterus. Extraocular Movements: Extraocular movements intact. Conjunctiva/sclera: Conjunctivae normal.      Pupils: Pupils are equal, round, and reactive to light. Cardiovascular:      Rate and Rhythm: Normal rate and regular rhythm. Pulses: Normal pulses. Heart sounds: Normal heart sounds. No murmur heard. No friction rub. Pulmonary:      Effort: Pulmonary effort is normal. No respiratory distress. Breath sounds: Normal breath sounds. No stridor. No wheezing, rhonchi or rales. Chest:      Chest wall: No tenderness. Abdominal:      General: Abdomen is flat. Bowel sounds are normal. There is no distension. Tenderness: There is no abdominal tenderness. There is no guarding. Musculoskeletal:         General: No swelling or tenderness. Normal range of motion. Cervical back: Normal range of motion and neck supple. No rigidity. Skin:     General: Skin is warm and dry. Coloration: Skin is not jaundiced. Neurological:      General: No focal deficit present. Mental Status: She is alert and oriented to person, place, and time. Mental status is at baseline. Cranial Nerves: No cranial nerve deficit. Sensory: No sensory deficit. Motor: No weakness. Gait: Gait normal.   Psychiatric:         Mood and Affect: Mood normal.         Thought Content:  Thought content normal.         Judgment: Judgment normal.                  An electronic signature was used to authenticate this note.    --Silvana Ayala MD

## 2023-02-27 RX ORDER — MOMETASONE FUROATE 50 UG/1
SPRAY, METERED NASAL
Qty: 51 G | Refills: 1 | Status: SHIPPED | OUTPATIENT
Start: 2023-02-27

## 2023-05-11 ENCOUNTER — OFFICE VISIT (OUTPATIENT)
Dept: PULMONOLOGY | Age: 53
End: 2023-05-11
Payer: COMMERCIAL

## 2023-05-11 VITALS
DIASTOLIC BLOOD PRESSURE: 97 MMHG | HEART RATE: 79 BPM | OXYGEN SATURATION: 99 % | WEIGHT: 221 LBS | TEMPERATURE: 97.8 F | HEIGHT: 65 IN | BODY MASS INDEX: 36.82 KG/M2 | SYSTOLIC BLOOD PRESSURE: 153 MMHG | RESPIRATION RATE: 16 BRPM

## 2023-05-11 DIAGNOSIS — G47.33 OBSTRUCTIVE SLEEP APNEA: ICD-10-CM

## 2023-05-11 DIAGNOSIS — E66.9 CLASS 2 OBESITY WITHOUT SERIOUS COMORBIDITY WITH BODY MASS INDEX (BMI) OF 37.0 TO 37.9 IN ADULT, UNSPECIFIED OBESITY TYPE: ICD-10-CM

## 2023-05-11 DIAGNOSIS — J32.9 SINUSITIS, UNSPECIFIED CHRONICITY, UNSPECIFIED LOCATION: ICD-10-CM

## 2023-05-11 DIAGNOSIS — J45.40 MODERATE PERSISTENT ASTHMA WITHOUT COMPLICATION: Primary | ICD-10-CM

## 2023-05-11 DIAGNOSIS — G47.10 HYPERSOMNOLENCE: ICD-10-CM

## 2023-05-11 PROCEDURE — 99214 OFFICE O/P EST MOD 30 MIN: CPT | Performed by: INTERNAL MEDICINE

## 2023-05-11 ASSESSMENT — ENCOUNTER SYMPTOMS
RESPIRATORY NEGATIVE: 1
ALLERGIC/IMMUNOLOGIC NEGATIVE: 1
EYES NEGATIVE: 1
GASTROINTESTINAL NEGATIVE: 1

## 2023-05-11 NOTE — PROGRESS NOTES
MA Communication: The following orders are received by verbal communication from Sanjana Machado MD    Orders include:  1yr f/u, orders to be sent to DME.

## 2023-05-11 NOTE — PROGRESS NOTES
Yane Orosco (:  1970) is a 48 y.o. female,Established patient, here for evaluation of the following chief complaint(s):  Follow-up (8 month pulm/sleep f/u) and Asthma         ASSESSMENT/PLAN:  1. Moderate persistent asthma without complication  2. Obstructive sleep apnea  3. Hypersomnolence  4. Sinusitis, unspecified chronicity, unspecified location  5. Class 2 obesity without serious comorbidity with body mass index (BMI) of 37.0 to 37.9 in adult, unspecified obesity type        Asthma, moderate  May consider using  dulera 200 if any congestion twice a day for 5-7 days     Summer Plan:   start on Dulera 100/5  And will consider ordering this at that time     Fall/Winter plan  On Dulera 200    Right now on   dulera 100 2 puffs twice a day    2/19/15  Spirometry FEV1 was 95% and 2.89 liter    19  spirometry showed FEV1 of 94% and 2.79  FENO was 5 ppb    Continue with :  claritin once a day  nasaonex 2 spray/nostril once a day  Albuterol (rescue) Ventolin 2 puffs as needed, only with exercise  Dulera 100/5 2puff twice a day         Continue with exercise  Sinus are control    Only using albuterol prior to exercise  No limitations at this time    flushot up to date. Blood pressure was high watch this, maybe needed repeat  This is white coat syndrome      vaccinations  pneumovac utd  covid up todate with booster  flushot up to date    HENRY          autopap is set min 5  And max of 15  epr is 3  Standard response  dme is [facility]    Using nasal pillows  I have access via [employer]    Using 100%  Using 7  h/night      ahi is 1.1 no sleep apnea  Leak at 4.6 lpm  90% pressure is 7.6  New mask and doing well, with nasal mask    Feeling the benefit of cpap use      RTC in 1 year. No follow-ups on file.        I have personally reviewed and summarized the old records a      I have independently reviewed the images and reviewed with patient    I have reviewed the lab tests, radiology reports and

## 2023-05-11 NOTE — PATIENT INSTRUCTIONS
less than 24hrs in advance will be considered a no show. Patients with three missed appointments within 1 year or four missed appointments within 2 years can be dismissed from the practice. Please be aware that our physicians are required to work in the Intensive Care Unit at [facility].  Your appointment may need to be rescheduled if they are designated to work during your appointment time. You may receive a survey regarding the care you received during your visit. Your input is valuable to us. We encourage you to complete and return your survey. We hope you will choose us in the future for your healthcare needs. Pt instructed of all future appointment dates & times, including radiology, labs, procedures & referrals. If procedures were scheduled preparation instructions provided. Instructions on future appointments with [facility] were given.

## 2023-05-22 RX ORDER — MOMETASONE FUROATE AND FORMOTEROL FUMARATE DIHYDRATE 100; 5 UG/1; UG/1
AEROSOL RESPIRATORY (INHALATION)
Qty: 39 G | Refills: 3 | OUTPATIENT
Start: 2023-05-22

## 2023-05-31 NOTE — TELEPHONE ENCOUNTER
Pt informed of Mary Beth's response below and verbalized understanding. She did confirm she only wanted the 100 strength sent in.

## 2023-05-31 NOTE — TELEPHONE ENCOUNTER
She can not be on both Dulera 200 and Dulera 100 at the same time. It's either one or the other. I have sent the [facility] 100 according to Dr Mick Yin last note for Summer plan. Rinse mouth out after use to prevent hoarseness and thrush.

## 2023-05-31 NOTE — TELEPHONE ENCOUNTER
Patient called in and stated pharmacy instructed her to reach out to our office about refill Dulera 100  Looking in patients chart confirmed with patient Dulera 200 is a current medication  However she states Dr. Nadja Cooper also has her on Dulera 100 during the seasonal months  I Informed the patient Ana Dunnings 100 was list cleanup and she then went on to say she needs the dulera 100 also and Dr. Nadja Cooper knows this  Last office visit with Dr. Nadja Cooper  05/11/2023     Pharmacy is [id number] [facility]   Patient also states her insurance requires a 90 day supply     Informed pt the doctor is working in the ICU this week and this medication request must be reviewed by the NP in our office

## 2023-12-01 RX ORDER — MOMETASONE FUROATE 50 UG/1
SPRAY, METERED NASAL
Qty: 51 G | Refills: 1 | Status: SHIPPED | OUTPATIENT
Start: 2023-12-01

## 2024-05-03 ENCOUNTER — OFFICE VISIT (OUTPATIENT)
Dept: PULMONOLOGY | Age: 54
End: 2024-05-03
Payer: COMMERCIAL

## 2024-05-03 VITALS
BODY MASS INDEX: 36.99 KG/M2 | TEMPERATURE: 96.9 F | RESPIRATION RATE: 16 BRPM | WEIGHT: 222 LBS | SYSTOLIC BLOOD PRESSURE: 159 MMHG | OXYGEN SATURATION: 97 % | HEIGHT: 65 IN | DIASTOLIC BLOOD PRESSURE: 94 MMHG | HEART RATE: 94 BPM

## 2024-05-03 DIAGNOSIS — E66.01 CLASS 2 SEVERE OBESITY DUE TO EXCESS CALORIES WITH SERIOUS COMORBIDITY AND BODY MASS INDEX (BMI) OF 36.0 TO 36.9 IN ADULT (HCC): ICD-10-CM

## 2024-05-03 DIAGNOSIS — R03.0 ELEVATED BLOOD PRESSURE READING: ICD-10-CM

## 2024-05-03 DIAGNOSIS — G47.33 OSA (OBSTRUCTIVE SLEEP APNEA): Primary | ICD-10-CM

## 2024-05-03 DIAGNOSIS — J45.40 MODERATE PERSISTENT ASTHMA WITHOUT COMPLICATION: ICD-10-CM

## 2024-05-03 DIAGNOSIS — J32.9 CHRONIC SINUSITIS, UNSPECIFIED LOCATION: ICD-10-CM

## 2024-05-03 PROBLEM — J45.21 MILD INTERMITTENT ASTHMA WITH ACUTE EXACERBATION: Status: ACTIVE | Noted: 2021-06-25

## 2024-05-03 PROCEDURE — 99214 OFFICE O/P EST MOD 30 MIN: CPT | Performed by: NURSE PRACTITIONER

## 2024-05-03 RX ORDER — MOMETASONE FUROATE 50 UG/1
2 SPRAY, METERED NASAL DAILY
Qty: 51 G | Refills: 1 | Status: SHIPPED | OUTPATIENT
Start: 2024-05-03

## 2024-05-03 RX ORDER — ALBUTEROL SULFATE 90 UG/1
2 AEROSOL, METERED RESPIRATORY (INHALATION) EVERY 6 HOURS PRN
Qty: 54 G | Refills: 1 | Status: SHIPPED | OUTPATIENT
Start: 2024-05-03

## 2024-05-03 ASSESSMENT — ENCOUNTER SYMPTOMS
SORE THROAT: 0
RHINORRHEA: 0
COUGH: 0
CHEST TIGHTNESS: 0
WHEEZING: 0
SHORTNESS OF BREATH: 0
ABDOMINAL PAIN: 0
EYE PAIN: 0

## 2024-05-03 ASSESSMENT — SLEEP AND FATIGUE QUESTIONNAIRES
HOW LIKELY ARE YOU TO NOD OFF OR FALL ASLEEP WHEN YOU ARE A PASSENGER IN A CAR FOR AN HOUR WITHOUT A BREAK: WOULD NEVER DOZE
HOW LIKELY ARE YOU TO NOD OFF OR FALL ASLEEP WHILE SITTING QUIETLY AFTER LUNCH WITHOUT ALCOHOL: WOULD NEVER DOZE
HOW LIKELY ARE YOU TO NOD OFF OR FALL ASLEEP IN A CAR, WHILE STOPPED FOR A FEW MINUTES IN TRAFFIC: WOULD NEVER DOZE
HOW LIKELY ARE YOU TO NOD OFF OR FALL ASLEEP WHILE SITTING AND READING: WOULD NEVER DOZE
HOW LIKELY ARE YOU TO NOD OFF OR FALL ASLEEP WHILE SITTING AND TALKING TO SOMEONE: WOULD NEVER DOZE
HOW LIKELY ARE YOU TO NOD OFF OR FALL ASLEEP WHILE WATCHING TV: WOULD NEVER DOZE
HOW LIKELY ARE YOU TO NOD OFF OR FALL ASLEEP WHILE LYING DOWN TO REST IN THE AFTERNOON WHEN CIRCUMSTANCES PERMIT: WOULD NEVER DOZE
ESS TOTAL SCORE: 0
HOW LIKELY ARE YOU TO NOD OFF OR FALL ASLEEP WHILE SITTING INACTIVE IN A PUBLIC PLACE: WOULD NEVER DOZE

## 2024-05-03 NOTE — PROGRESS NOTES
MA Communication:  The following orders are received by verbal communication from Mary Beth Ontiveros CNP    Orders include:  supplies, 1 year with f[facility]erick    
    Assessment/Plan:     1. HENRY (obstructive sleep apnea)  -     CPAP  2. Moderate persistent asthma without complication  -     albuterol sulfate HFA (PROVENTIL;VENTOLIN;PROAIR) 108 (90 Base) MCG/ACT inhaler; Inhale 2 puffs into the lungs every 6 hours as needed for Wheezing, Disp-54 g, R-1Normal  3. Chronic sinusitis, unspecified location  -     mometasone (NASONEX) 50 MCG/ACT nasal spray; 2 sprays by Nasal route daily, Nasal, DAILY Starting Fri 5/3/2024, Disp-51 g, R-1, Normal  4. Class 2 severe obesity due to excess calories with serious comorbidity and body mass index (BMI) of 36.0 to 36.9 in adult (HCC)  5. Elevated blood pressure reading       Prior pulmonary OV note, PFT report and recent chest imaging report reviewed.   I have personally reviewed and summarized the old records and/or obtained further history from someone other than the patient.    Reviewed present meds and side effects. Reviewed proper inhaler usage. She is to continue Dulera and using albuterol as needed with increased SOB, wheezing. This is controlling her asthma.     Discussed when to call with worsening symptoms such as increased shortness of breath, productive cough, wheezing or symptoms not responding to treatment plan.         Jessie Garay has good benefit and adherence on PAP therapy.  Compliance report information was analyzed to assess complexity and medical decision making in regards to further testing and management.  Will prescribe home medical equipment company to check pressures, download usage and will replace mask, tubing, disposables and filters as needed.       Instructed to wash or wipe face of excess oil before using CPAP to prevent the mask / nasal pillow from sliding and ensure proper fit.  Empty the water daily and allow the chamber and tubings to air dry. Instructed how to properly clean the device to prevent bacteria and mold growth in the water chamber.       Advised to avoid driving if too sleepy to function

## 2024-05-03 NOTE — PATIENT INSTRUCTIONS
to reschedule your appointment if you arrive later than your scheduled appointment time. Appointments cancelled less than 24hrs in advance will be considered a no show. Patients with three missed appointments within 1 year or four missed appointments within 2 years can be dismissed from the practice.     Please be aware that our physicians are required to work in the Intensive Care Unit at [facility].  Your appointment may need to be rescheduled if they are designated to work during your appointment time.      You may receive a survey regarding the care you received during your visit.  Your input is valuable to us.  We encourage you to complete and return your survey.  We hope you will choose us in the future for your healthcare needs.     Pt instructed of all future appointment dates & times, including radiology, labs, procedures & referrals. If procedures were scheduled preparation instructions provided. Instructions on future appointments with [facility] were given.      In the next few weeks, you will be receiving a survey from [facility] regarding your visit today.  We would greatly appreciate it if you would take just a few minutes to fill that out.  It is very important to us that our patients receive top notch care and our surveys help keep us accountable. However, if your experience was not a good one, we want to hear about that as well. This is a key way we can keep track of problems and strive to correct any for future visits.    Again, we appreciate your time and thank you for choosing [facility]!    MANUEL Marie

## 2024-09-02 ENCOUNTER — PATIENT MESSAGE (OUTPATIENT)
Dept: PULMONOLOGY | Age: 54
End: 2024-09-02

## 2024-09-04 ENCOUNTER — TELEPHONE (OUTPATIENT)
Dept: PULMONOLOGY | Age: 54
End: 2024-09-04

## 2024-09-04 DIAGNOSIS — J45.40 MODERATE PERSISTENT ASTHMA WITHOUT COMPLICATION: Primary | ICD-10-CM

## 2024-09-04 RX ORDER — FLUTICASONE PROPIONATE AND SALMETEROL 250; 50 UG/1; UG/1
1 POWDER RESPIRATORY (INHALATION) EVERY 12 HOURS
Qty: 180 EACH | Refills: 0 | Status: SHIPPED | OUTPATIENT
Start: 2024-09-04

## 2024-09-04 NOTE — TELEPHONE ENCOUNTER
Patient stated she has been on Dulera for years. She did not want to do the alternative. She would like for us to do the appeal and in the meantime she will be contacting her insurance also to see if something has changed or is now required.

## 2024-09-04 NOTE — TELEPHONE ENCOUNTER
Pharmacy states Dulera is not covered and alternatives are given in request in media. Linked to provider. Please advise.

## 2024-09-04 NOTE — TELEPHONE ENCOUNTER
Attempted to start a prior auth but I could not accurately respond to their questions regarding if the patient has been tried on any other medications. She has been on Dulera since 2013, from what I can see if her chart.   (After further digging, I found Dr. Alston notes from 2/8/2012 that patient has tries Asmanex, but kourtney due to exacerbations.)  Patient has option to try GoodRx or pay OOP if she wants to continue with Dulera. Or, Mary Beth sent a script for Wixela, which is the same drug class as Dulera.   We can still see what her insurance says before moving forward with anything else. Possibly start another PA or appeal with new information about her trial with Asmanex.

## 2024-09-04 NOTE — TELEPHONE ENCOUNTER
I have sent Wixela to her pharmacy. Dosing is different.  She is to take 1 puff twice a day. Rinse mouth out after use to prevent hoarseness and thrush.  Call if having issues with new medication. Please let her know.

## 2025-01-23 ENCOUNTER — PATIENT MESSAGE (OUTPATIENT)
Dept: PULMONOLOGY | Age: 55
End: 2025-01-23

## 2025-01-24 ENCOUNTER — TELEPHONE (OUTPATIENT)
Dept: PULMONOLOGY | Age: 55
End: 2025-01-24

## 2025-01-24 DIAGNOSIS — J32.9 CHRONIC SINUSITIS, UNSPECIFIED LOCATION: ICD-10-CM

## 2025-01-24 RX ORDER — MOMETASONE FUROATE MONOHYDRATE 50 UG/1
2 SPRAY, METERED NASAL DAILY
Qty: 51 G | Refills: 1 | Status: SHIPPED | OUTPATIENT
Start: 2025-01-24

## 2025-01-24 NOTE — TELEPHONE ENCOUNTER
Patient needing Nasonex refill.   Last seen: 5/3/24.   Next appt: 5/20/25  Last ordered: 5/3/24.  Order pended.

## 2025-01-24 NOTE — TELEPHONE ENCOUNTER
Do not see anything about needing a pre approval for the spray. Will request a refill and see if they send us anything.

## 2025-01-28 ENCOUNTER — TELEPHONE (OUTPATIENT)
Dept: ADMINISTRATIVE | Age: 55
End: 2025-01-28

## 2025-01-28 NOTE — TELEPHONE ENCOUNTER
Submitted PA for Mometasone Furoate 50MCG/ACT suspension   [facility]: [id number]  STATUS: PENDING.    Follow up done daily; if no decision with in three days we will refax.  If another three days goes by with no decision will call the insurance for status.

## 2025-01-28 NOTE — TELEPHONE ENCOUNTER
Pharmacy called and they ran it through and sent it out this morning when they also received approval.

## 2025-05-20 ENCOUNTER — OFFICE VISIT (OUTPATIENT)
Dept: PULMONOLOGY | Age: 55
End: 2025-05-20
Payer: COMMERCIAL

## 2025-05-20 VITALS
HEART RATE: 89 BPM | SYSTOLIC BLOOD PRESSURE: 163 MMHG | TEMPERATURE: 97.7 F | OXYGEN SATURATION: 99 % | WEIGHT: 219.4 LBS | HEIGHT: 65 IN | BODY MASS INDEX: 36.55 KG/M2 | DIASTOLIC BLOOD PRESSURE: 101 MMHG | RESPIRATION RATE: 16 BRPM

## 2025-05-20 DIAGNOSIS — J45.40 MODERATE PERSISTENT ASTHMA WITHOUT COMPLICATION: Primary | ICD-10-CM

## 2025-05-20 DIAGNOSIS — G47.33 OSA (OBSTRUCTIVE SLEEP APNEA): ICD-10-CM

## 2025-05-20 PROCEDURE — 99214 OFFICE O/P EST MOD 30 MIN: CPT | Performed by: INTERNAL MEDICINE

## 2025-05-20 PROCEDURE — G2211 COMPLEX E/M VISIT ADD ON: HCPCS | Performed by: INTERNAL MEDICINE

## 2025-05-20 ASSESSMENT — SLEEP AND FATIGUE QUESTIONNAIRES
HOW LIKELY ARE YOU TO NOD OFF OR FALL ASLEEP WHEN YOU ARE A PASSENGER IN A CAR FOR AN HOUR WITHOUT A BREAK: WOULD NEVER DOZE
HOW LIKELY ARE YOU TO NOD OFF OR FALL ASLEEP IN A CAR, WHILE STOPPED FOR A FEW MINUTES IN TRAFFIC: WOULD NEVER DOZE
HOW LIKELY ARE YOU TO NOD OFF OR FALL ASLEEP WHILE SITTING AND TALKING TO SOMEONE: WOULD NEVER DOZE
HOW LIKELY ARE YOU TO NOD OFF OR FALL ASLEEP WHILE WATCHING TV: WOULD NEVER DOZE
HOW LIKELY ARE YOU TO NOD OFF OR FALL ASLEEP WHILE LYING DOWN TO REST IN THE AFTERNOON WHEN CIRCUMSTANCES PERMIT: WOULD NEVER DOZE
HOW LIKELY ARE YOU TO NOD OFF OR FALL ASLEEP WHILE SITTING QUIETLY AFTER LUNCH WITHOUT ALCOHOL: WOULD NEVER DOZE
HOW LIKELY ARE YOU TO NOD OFF OR FALL ASLEEP WHILE SITTING AND READING: WOULD NEVER DOZE
ESS TOTAL SCORE: 0
HOW LIKELY ARE YOU TO NOD OFF OR FALL ASLEEP WHILE SITTING INACTIVE IN A PUBLIC PLACE: WOULD NEVER DOZE

## 2025-05-20 NOTE — PROGRESS NOTES
MA Communication:  The following orders are received by verbal communication from Renee Tavarez MD    Orders include:    1 year

## 2025-05-20 NOTE — PATIENT INSTRUCTIONS
Continue CPAP therapy with the current settings  Continue do not 200 mcg with mouth washing after use.  Continue albuterol as needed and before anticipated exertion  Continue Nasonex and Claritin for allergies  Follow-up in 12 months      Health Maintenance/Preventive measures:        >>  Avoid smoking, vaping or secondhand exposure.  Avoid exposure to irritants, allergens as possible as well as contact with patients with infectious respiratory illness.        >>  Stay up-to-date with influenza & pneumonia vaccines, RSV, & COVID-19 vaccine as recommended by the Advisory Committee on Immunization Practices (ACIP)        >>  Healthy diet and activity as able.        >>  Acid reflux precautions: Head of bed elevation, avoiding tight clothes, avoiding big meals or snacking 3 hours before bedtime, targeting healthy weight.        >>  Practice sleep hygiene measures. Avoid driving or operating heavy machines if tired or sleepy.

## 2025-06-24 RX ORDER — MOMETASONE FUROATE AND FORMOTEROL FUMARATE DIHYDRATE 200; 5 UG/1; UG/1
AEROSOL RESPIRATORY (INHALATION)
Qty: 39 G | Refills: 1 | Status: SHIPPED | OUTPATIENT
Start: 2025-06-24

## 2025-08-11 ENCOUNTER — TELEPHONE (OUTPATIENT)
Dept: ADMINISTRATIVE | Age: 55
End: 2025-08-11